# Patient Record
Sex: FEMALE | Race: WHITE
[De-identification: names, ages, dates, MRNs, and addresses within clinical notes are randomized per-mention and may not be internally consistent; named-entity substitution may affect disease eponyms.]

---

## 2017-04-27 ENCOUNTER — HOSPITAL ENCOUNTER (EMERGENCY)
Dept: HOSPITAL 17 - PHED | Age: 62
Discharge: HOME | End: 2017-04-27
Payer: COMMERCIAL

## 2017-04-27 VITALS
HEART RATE: 86 BPM | SYSTOLIC BLOOD PRESSURE: 125 MMHG | DIASTOLIC BLOOD PRESSURE: 83 MMHG | RESPIRATION RATE: 16 BRPM | TEMPERATURE: 97.6 F | OXYGEN SATURATION: 95 %

## 2017-04-27 VITALS
SYSTOLIC BLOOD PRESSURE: 111 MMHG | OXYGEN SATURATION: 100 % | DIASTOLIC BLOOD PRESSURE: 74 MMHG | HEART RATE: 82 BPM | RESPIRATION RATE: 18 BRPM

## 2017-04-27 VITALS — OXYGEN SATURATION: 95 % | RESPIRATION RATE: 16 BRPM

## 2017-04-27 VITALS — WEIGHT: 142.64 LBS | HEIGHT: 63 IN | BODY MASS INDEX: 25.27 KG/M2

## 2017-04-27 DIAGNOSIS — K52.9: Primary | ICD-10-CM

## 2017-04-27 DIAGNOSIS — J45.909: ICD-10-CM

## 2017-04-27 DIAGNOSIS — R10.9: ICD-10-CM

## 2017-04-27 LAB
ANION GAP SERPL CALC-SCNC: 10 MEQ/L (ref 5–15)
BACTERIA #/AREA URNS HPF: (no result) /HPF
BASOPHILS # BLD AUTO: 0 TH/MM3 (ref 0–0.2)
BASOPHILS NFR BLD: 0.1 % (ref 0–2)
BUN SERPL-MCNC: 18 MG/DL (ref 7–18)
CHLORIDE SERPL-SCNC: 104 MEQ/L (ref 98–107)
COLOR UR: YELLOW
COMMENT (UR): (no result)
CULTURE IF INDICATED: (no result)
EOSINOPHIL # BLD: 0.1 TH/MM3 (ref 0–0.4)
EOSINOPHIL NFR BLD: 1.1 % (ref 0–4)
ERYTHROCYTE [DISTWIDTH] IN BLOOD BY AUTOMATED COUNT: 12 % (ref 11.6–17.2)
GFR SERPLBLD BASED ON 1.73 SQ M-ARVRAT: 51 ML/MIN (ref 89–?)
GLUCOSE UR STRIP-MCNC: (no result) MG/DL
HCO3 BLD-SCNC: 26 MEQ/L (ref 21–32)
HCT VFR BLD CALC: 47.1 % (ref 35–46)
HEMO FLAGS: (no result)
HGB UR QL STRIP: (no result)
KETONES UR STRIP-MCNC: (no result) MG/DL
LEUKOCYTE ESTERASE UR QL STRIP: (no result) /HPF (ref 0–5)
LYMPHOCYTES # BLD AUTO: 1 TH/MM3 (ref 1–4.8)
LYMPHOCYTES NFR BLD AUTO: 9.4 % (ref 9–44)
MCH RBC QN AUTO: 27.3 PG (ref 27–34)
MCHC RBC AUTO-ENTMCNC: 32.6 % (ref 32–36)
MCV RBC AUTO: 83.8 FL (ref 80–100)
MONOCYTES NFR BLD: 3.5 % (ref 0–8)
NEUTROPHILS # BLD AUTO: 9.5 TH/MM3 (ref 1.8–7.7)
NEUTROPHILS NFR BLD AUTO: 85.9 % (ref 16–70)
NITRITE UR QL STRIP: (no result)
PLATELET # BLD: 244 TH/MM3 (ref 150–450)
POTASSIUM SERPL-SCNC: 4 MEQ/L (ref 3.5–5.1)
RBC # BLD AUTO: 5.62 MIL/MM3 (ref 4–5.3)
RBC #/AREA URNS HPF: (no result) /HPF (ref 0–3)
SODIUM SERPL-SCNC: 140 MEQ/L (ref 136–145)
SP GR UR STRIP: 1.02 (ref 1–1.03)
SQUAMOUS #/AREA URNS HPF: (no result) /HPF (ref 0–5)
WBC # BLD AUTO: 11 TH/MM3 (ref 4–11)

## 2017-04-27 PROCEDURE — 81001 URINALYSIS AUTO W/SCOPE: CPT

## 2017-04-27 PROCEDURE — 99284 EMERGENCY DEPT VISIT MOD MDM: CPT

## 2017-04-27 PROCEDURE — 85025 COMPLETE CBC W/AUTO DIFF WBC: CPT

## 2017-04-27 PROCEDURE — 80048 BASIC METABOLIC PNL TOTAL CA: CPT

## 2017-04-27 PROCEDURE — 96361 HYDRATE IV INFUSION ADD-ON: CPT

## 2017-04-27 PROCEDURE — 96374 THER/PROPH/DIAG INJ IV PUSH: CPT

## 2017-04-27 NOTE — PD
HPI


Chief Complaint:  GI Complaint


Time Seen by Provider:  01:59


Travel History


International Travel<30 days:  No


Contact w/Intl Traveler<30days:  No


Traveled to known affect area:  No





History of Present Illness


HPI


61-year-old female presents to the emergency department by private 

transportation for complaint of nausea vomiting diarrhea and intermittent 

abdominal pain.  Patient states symptoms began sometime after 7 PM this 

evening.  Daughter is being seen in the emergency department for same 

complaints and while visiting with her daughter started noticing that she was 

having similar symptoms.  Patient and daughter ate the same foods Tuesday 

through Wednesday patient has had no fever or chills.  No hematemesis no coffee-

ground emesis no melena hematochezia.  Patient states she's had bilious emesis 

was very watery diarrhea.  Patient continued to make good urine output.  

Patient has been able to tolerate sips of water.  Patient denies chest pain 

shortness of breath as well as denies fever or chills.  Patient presently has 

no abdominal pain.  Patient has history of dyslipidemia and hypothyroidism.  

Patient denies personal history of CAD hypertension diabetes or tobaccoism.  

Patient rates pain 0/10 intensity.  Patient denies any previous abdominal 

surgery.  Patient reports no known food borne illness or water ingestion or 

foreign travel.





PFSH


Past Medical History


*** Narrative Medical


Arthritis asthma dyslipidemia hypothyroidism; knee replacement; tobacco use; 

nursing notes reviewed


Arthritis:  Yes


Heart Rhythm Problems:  No


Cancer:  No


Cardiovascular Problems:  Yes


High Cholesterol:  Yes


Chest Pain:  Yes


Congestive Heart Failure:  No


Diabetes:  No


Diminished Hearing:  No


Endocrine:  Yes


GERD:  Yes


Genitourinary:  Yes


Hepatitis:  No


Hiatal Hernia:  No


Immune Disorder:  No


Kidney Stones:  No


Musculoskeletal:  Yes


Neurologic:  No


Psychiatric:  No


Reproductive:  No


Respiratory:  No


Renal Failure:  No


Thyroid Disease:  Yes


Ulcer:  No


Tetanus Vaccination:  Unknown


Influenza Vaccination:  No


Pregnant?:  Not Pregnant


LMP:  POST MENAPAUSAL


:  3


Para:  3





Past Surgical History


AICD:  No


Joint Replacement:  Yes (RIGHT KNEE)


Pacemaker:  No





Social History


Alcohol Use:  Yes (OCC)


Tobacco Use:  Yes (OCC CIG.  MAYBE 1-2 CIGS MONTH)


Substance Use:  No





Allergies-Medications


(Allergen,Severity, Reaction):  


Coded Allergies:  


     No Known Allergies (Verified , 07)


Reported Meds & Prescriptions





Reported Meds & Active Scripts


Active


Reported


Levothyroxine (Levothyroxine Sodium) 112 Mcg Tab 112 Mcg PO DAILY








Review of Systems


Except as stated in HPI:  all other systems reviewed are Neg


General / Constitutional:  No: Fever, Chills


HENT:  No: Congestion


Cardiovascular:  No: Chest Pain or Discomfort


Respiratory:  No: Shortness of Breath


Gastrointestinal:  Positive: Nausea, Vomiting, Diarrhea, Abdominal Pain (

intermittent crampy none at this time)


Genitourinary:  No: Dysuria, Decreased Urinary Output


Musculoskeletal:  No: Myalgias, Arthralgias


Skin:  No Rash


Neurologic:  No: Weakness


Psychiatric:  No: Anxiety


Hematologic/Lymphatic:  No: Easy Bruising





Physical Exam


Narrative


GENERAL: Well-developed well-nourished female in no acute distress no 

respiratory distress


SKIN: Warm and dry.


HEAD: Normocephalic.


EYES: No scleral icterus. No injection or drainage. 


NECK: Supple, trachea midline. No JVD or lymphadenopathy.


CARDIOVASCULAR: Regular rate and rhythm without murmurs, gallops, or rubs. 


RESPIRATORY: Breath sounds equal bilaterally. No accessory muscle use.


GASTROINTESTINAL: Abdomen soft, non-tender, nondistended. 


MUSCULOSKELETAL: No cyanosis, or edema. 


BACK: Nontender without obvious deformity. No CVA tenderness.








Data


Data


Last Documented VS





Vital Signs








  Date Time  Temp Pulse Resp B/P Pulse Ox O2 Delivery O2 Flow Rate FiO2


 


17 01:36   16     


 


17 01:30     95 Room Air  


 


17 01:09 97.6 86  125/83    








Orders








 Complete Blood Count With Diff (17 01:59)


Basic Metabolic Panel (Bmp) (17 01:59)


Urinalysis - C+S If Indicated (17 01:59)


Iv Access Insert/Monitor (17 01:59)


Ecg Monitoring (17 01:59)


Oximetry (17 01:59)


Ondansetron Inj (Zofran Inj) (17 02:00)


Sodium Chlor 0.9% 1000 Ml Inj (Ns 1000 M (17 01:59)


Sodium Chloride 0.9% Flush (Ns Flush) (17 02:00)








Labs








 Laboratory Tests








Test 17





 01:45 03:10


 


White Blood Count 11.0 TH/MM3 


 


Red Blood Count 5.62 MIL/MM3 


 


Hemoglobin 15.4 GM/DL 


 


Hematocrit 47.1 % 


 


Mean Corpuscular Volume 83.8 FL 


 


Mean Corpuscular Hemoglobin 27.3 PG 


 


Mean Corpuscular Hemoglobin 32.6 % 





Concent  


 


Red Cell Distribution Width 12.0 % 


 


Platelet Count 244 TH/MM3 


 


Mean Platelet Volume 9.0 FL 


 


Neutrophils (%) (Auto) 85.9 % 


 


Lymphocytes (%) (Auto) 9.4 % 


 


Monocytes (%) (Auto) 3.5 % 


 


Eosinophils (%) (Auto) 1.1 % 


 


Basophils (%) (Auto) 0.1 % 


 


Neutrophils # (Auto) 9.5 TH/MM3 


 


Lymphocytes # (Auto) 1.0 TH/MM3 


 


Monocytes # (Auto) 0.4 TH/MM3 


 


Eosinophils # (Auto) 0.1 TH/MM3 


 


Basophils # (Auto) 0.0 TH/MM3 


 


CBC Comment DIFF FINAL  


 


Differential Comment   


 


Sodium Level 140 MEQ/L 


 


Potassium Level 4.0 MEQ/L 


 


Chloride Level 104 MEQ/L 


 


Carbon Dioxide Level 26.0 MEQ/L 


 


Anion Gap 10 MEQ/L 


 


Blood Urea Nitrogen 18 MG/DL 


 


Creatinine 1.10 MG/DL 


 


Estimat Glomerular Filtration 51 ML/MIN 





Rate  


 


Random Glucose 132 MG/DL 


 


Calcium Level 9.4 MG/DL 


 


Urine Color  YELLOW 


 


Urine Turbidity  CLEAR 


 


Urine pH  6.0 


 


Urine Specific Gravity  1.016 


 


Urine Protein  NEG mg/dL


 


Urine Glucose (UA)  NEG mg/dL


 


Urine Ketones  NEG mg/dL


 


Urine Occult Blood  SMALL 


 


Urine Nitrite  NEG 


 


Urine Bilirubin  NEG 


 


Urine Leukocyte Esterase  NEG 


 


Urine RBC  4-9 /hpf


 


Urine WBC  0-2 /hpf


 


Urine Squamous Epithelial  0-5 /hpf





Cells  


 


Urine Bacteria  NONE /hpf


 


Microscopic Urinalysis Comment  CULT NOT





  INDICATED














MDM


Medical Decision Making


Medical Screen Exam Complete:  Yes


Emergency Medical Condition:  Yes


Medical Record Reviewed:  Yes


Interpretation(s)


CBC & BMP Diagram


17 01:45








ua: wnl





 Vital Signs








  Date Time  Temp Pulse Resp B/P Pulse Ox O2 Delivery O2 Flow Rate FiO2


 


17 01:36   16     


 


17 01:30   16  95 Room Air  


 


17 01:09 97.6 86 16 125/83 95   








Differential Diagnosis


Gastroenteritis, food borne illness, electronic disturbance, dehydration


Narrative Course


IV access obtained specimens collected sent for resulting Zofran 4 mg IV 

administered along with 1 L normal saline


Patient clinically improved after fluid hydration and anti-medic


Lab values found to be grossly within normal range


Patient given follow oral hydration; patient otherwise stable for outpatient 

management and follow-up with primary care provider





Diagnosis





 Primary Impression:  


 Gastroenteritis


Referrals:  


Primary Care Physician


call for appointment


Patient Instructions:  General Instructions





***Additional Instructions:


Increase fluid hydration


Follow clear liquid diet for next 12-24 hours advance as tolerated to bland/

Sheila diet then regular diet


Monitor temperature take acetaminophen/Tylenol as needed for fever 100.4F or 

greater


Follow-up with primary care provider


Return to the emergency department for any concerns or change in condition


Takes Zofran as prescribed as needed for nausea and/or vomiting


***Med/Other Pt SpecificInfo:  Prescription(s) given


Scripts


Ondansetron Odt (Zofran Odt)4 Mg Tab4 Mg SL Q6HR PRN (Nausea/Vomiting) #10 TAB  

Ref 0


   Prov:Lisette Gray MD         17


Disposition:  01 DISCHARGE HOME


Condition:  Stable








Lisette Gray MD 2017 02:04

## 2018-06-13 ENCOUNTER — HOSPITAL ENCOUNTER (EMERGENCY)
Dept: HOSPITAL 17 - PHED | Age: 63
Discharge: HOME | End: 2018-06-13
Payer: COMMERCIAL

## 2018-06-13 VITALS — HEIGHT: 64 IN | BODY MASS INDEX: 24.09 KG/M2 | WEIGHT: 141.1 LBS

## 2018-06-13 VITALS
HEART RATE: 108 BPM | DIASTOLIC BLOOD PRESSURE: 87 MMHG | TEMPERATURE: 99.1 F | RESPIRATION RATE: 20 BRPM | SYSTOLIC BLOOD PRESSURE: 139 MMHG | OXYGEN SATURATION: 95 %

## 2018-06-13 VITALS
DIASTOLIC BLOOD PRESSURE: 87 MMHG | OXYGEN SATURATION: 95 % | HEART RATE: 97 BPM | SYSTOLIC BLOOD PRESSURE: 138 MMHG | RESPIRATION RATE: 20 BRPM

## 2018-06-13 DIAGNOSIS — F17.210: ICD-10-CM

## 2018-06-13 DIAGNOSIS — E78.00: ICD-10-CM

## 2018-06-13 DIAGNOSIS — R22.0: Primary | ICD-10-CM

## 2018-06-13 DIAGNOSIS — M19.90: ICD-10-CM

## 2018-06-13 DIAGNOSIS — E07.9: ICD-10-CM

## 2018-06-13 DIAGNOSIS — K21.9: ICD-10-CM

## 2018-06-13 DIAGNOSIS — Z79.899: ICD-10-CM

## 2018-06-13 PROCEDURE — 96365 THER/PROPH/DIAG IV INF INIT: CPT

## 2018-06-13 PROCEDURE — 99284 EMERGENCY DEPT VISIT MOD MDM: CPT

## 2018-06-13 PROCEDURE — 96375 TX/PRO/DX INJ NEW DRUG ADDON: CPT

## 2018-06-13 NOTE — PD
HPI


Chief Complaint:  Facial Pain or Swelling


Time Seen by Provider:  22:41


Travel History


International Travel<30 days:  No


Contact w/Intl Traveler<30days:  No


Traveled to known affect area:  No





History of Present Illness


HPI


The patient is a 62-year-old female that had sublingual glands removed today by 

Dr. Moreno in [place].  The patient has the expected swelling around the 

sublingual area and swelling of the tongue.  She does not have an airway 

obstruction and is able to verbalize.  She came in because of the possibility 

of airway obstruction and she was advised by  Dr. Moreno to get an evaluation of 

the swelling in the emergency department.  The pain is a dull throbbing pain.  

She was given hydrocodone tablets for this and she is able to crush those up 

and take these as prescribed.  She prefers that any oral medications given by 

us would be liquids.  She denies any fever.





PFSH


Past Medical History


Arthritis:  Yes


Heart Rhythm Problems:  No


Cancer:  No


Cardiovascular Problems:  Yes


High Cholesterol:  Yes


Chest Pain:  Yes


Congestive Heart Failure:  No


Diabetes:  No


Diminished Hearing:  No


Endocrine:  Yes


Gastrointestinal Disorders:  Yes


GERD:  Yes


Genitourinary:  Yes


Hepatitis:  No


Hiatal Hernia:  No


Immune Disorder:  No


Kidney Stones:  No


Musculoskeletal:  Yes


Neurologic:  No


Psychiatric:  No


Reproductive:  No


Respiratory:  No


Renal Failure:  No


Thyroid Disease:  Yes


Ulcer:  No


Tetanus Vaccination:  Unknown


Influenza Vaccination:  No


Pregnant?:  Not Pregnant


Menopausal:  Yes


:  3


Para:  3





Past Surgical History


AICD:  No


Joint Replacement:  Yes (RIGHT KNEE)


Oral Surgery:  Yes (18)


Pacemaker:  No





Social History


Alcohol Use:  Yes (OCC)


Tobacco Use:  Yes (OCC CIG.  MAYBE 1-2 CIGS MONTH)


Substance Use:  No





Allergies-Medications


(Allergen,Severity, Reaction):  


Coded Allergies:  


     No Known Allergies (Verified , 07)


Reported Meds & Prescriptions





Reported Meds & Active Scripts


Active


Dexamethasone Liq (Dexamethasone) 0.5 Mg/5 Ml Soln 4 Mg PO DAILY 5 Days


Clindamycin Liq 75 Mg/5 Ml Soln 300 Mg PO Q6H 10 Days


Reported


Hydrocodone-Acetaminophen 5-325 mg Tab 1 Tab PO Q4H PRN


Levothyroxine (Levothyroxine Sodium) 112 Mcg Tab 112 Mcg PO DAILY








Review of Systems


Except as stated in HPI:  all other systems reviewed are Neg





Physical Exam


Narrative


GENERAL: Well-nourished, well-developed patient in no respiratory distress but 

moderate distress with her mouth pain.  Her vital signs show a pulse of 108 but 

are otherwise normal.


SKIN: Focused skin assessment warm/dry.


HEAD: Normocephalic.


EYES: No scleral icterus. No injection or drainage. 


NECK: Supple, trachea midline. No JVD or lymphadenopathy.


CARDIOVASCULAR: Regular rate and rhythm without murmurs, gallops, or rubs. 


RESPIRATORY: Breath sounds equal bilaterally. No accessory muscle use.


GASTROINTESTINAL: Abdomen soft, non-tender, nondistended. 


MUSCULOSKELETAL: No cyanosis, or edema. 


BACK: Nontender without obvious deformity. No CVA tenderness. 


ENT: There is the expected postsurgical swelling of the sublingual glands and 

slight swelling of the tongue with the tongue being pushed up.  This appears to 

be expected swelling and no abscess formation is noted.  No cellulitis is noted.





Data


Data


Last Documented VS





Vital Signs








  Date Time  Temp Pulse Resp B/P (MAP) Pulse Ox O2 Delivery O2 Flow Rate FiO2


 


18 22:15   20     


 


18 22:14 99.1 108  139/87 (104) 95   








Orders





 Orders


Clindamycin 900 Mg/Ns Premix (Cleocin 90 (18 22:45)


Methylprednisolone So Succ Inj (Solumedr (18 22:45)








MDM


Medical Decision Making


Medical Screen Exam Complete:  Yes


Emergency Medical Condition:  Yes


Medical Record Reviewed:  Yes


Differential Diagnosis


Cellulitis of tongue, allergic reaction, postsurgical swelling, sublingual 

abscess


Narrative Course


I discussed the patient with Dr. Moreno at his phone number [phone].  He 

and I both agreed that antibiotics were warranted and he suggested clindamycin.

  I will also give her some prednisone, Dr. Moreno also concurred.  The 

medications will be liquids.  Dr. Moreno will see the patient tomorrow in his 

office.





Impression postsurgical swelling of tongue





Diagnosis





 Primary Impression:  


 Postsurgical swelling of tongue





***Additional Instructions:  


The clindamycin is taken 20 cc every 6 hours for 10 days.  The dexamethasone is 

4 mg daily (40 cc) daily for 5 days.  Methylprednisolone 125 mg and 900 mg of 

clindamycin will be given IV here.  The patient will follow up with Dr. Moreno 

tomorrow.  He will likely need to sleep upright in a chair tonight.  The 

patient will follow up with Dr. Moreno tomorrow.


***Med/Other Pt SpecificInfo:  Prescription(s) given


Scripts


Dexamethasone Liq (Dexamethasone Liq) 0.5 Mg/5 Ml Soln


4 MG PO DAILY for 5 Days, #200 ML 0 Refills


   Prov: Marcin Villanueva MD         18 


Clindamycin Liq (Clindamycin Liq) 75 Mg/5 Ml Soln


300 MG PO Q6H for Infection for 10 Days, #800 ML 0 Refills


   Prov: Marcin Villanueva MD         18


Disposition:  01 DISCHARGE HOME


Condition:  Stable











Marcin Villanueva MD 2018 23:02